# Patient Record
Sex: FEMALE | Race: ASIAN | NOT HISPANIC OR LATINO | Employment: FULL TIME | URBAN - METROPOLITAN AREA
[De-identification: names, ages, dates, MRNs, and addresses within clinical notes are randomized per-mention and may not be internally consistent; named-entity substitution may affect disease eponyms.]

---

## 2017-03-14 ENCOUNTER — AMBULATORY - HEALTHEAST (OUTPATIENT)
Dept: MULTI SPECIALTY CLINIC | Facility: CLINIC | Age: 27
End: 2017-03-14

## 2017-03-14 LAB — PAP SMEAR - HIM PATIENT REPORTED: NORMAL

## 2018-01-17 ENCOUNTER — RECORDS - HEALTHEAST (OUTPATIENT)
Dept: LAB | Facility: HOSPITAL | Age: 28
End: 2018-01-17

## 2018-01-18 LAB
HBV SURFACE AB SERPL IA-ACNC: NEGATIVE M[IU]/ML
RUBV IGG SERPL QL IA: NORMAL

## 2018-01-19 LAB
MEV IGG SER IA-ACNC: ABNORMAL
MUV IGG SER QL IA: NORMAL
QTF INTERPRETATION: NORMAL
QTF MITOGEN - NIL: >10 IU/ML
QTF NIL: 0.08 IU/ML
QTF RESULT: NEGATIVE
QTF TB ANTIGEN - NIL: -0.01 IU/ML
VZV IGG SER QL IA: NORMAL

## 2018-09-26 ENCOUNTER — OFFICE VISIT - HEALTHEAST (OUTPATIENT)
Dept: FAMILY MEDICINE | Facility: CLINIC | Age: 28
End: 2018-09-26

## 2018-09-26 ENCOUNTER — COMMUNICATION - HEALTHEAST (OUTPATIENT)
Dept: TELEHEALTH | Facility: CLINIC | Age: 28
End: 2018-09-26

## 2018-09-26 DIAGNOSIS — Z00.00 HEALTH CARE MAINTENANCE: ICD-10-CM

## 2018-09-26 DIAGNOSIS — Z01.84 IMMUNITY STATUS TESTING: ICD-10-CM

## 2018-09-26 DIAGNOSIS — F41.9 ANXIETY: ICD-10-CM

## 2018-09-26 LAB
ALBUMIN SERPL-MCNC: 3.9 G/DL (ref 3.5–5)
ALP SERPL-CCNC: 26 U/L (ref 45–120)
ALT SERPL W P-5'-P-CCNC: 12 U/L (ref 0–45)
ANION GAP SERPL CALCULATED.3IONS-SCNC: 9 MMOL/L (ref 5–18)
AST SERPL W P-5'-P-CCNC: 13 U/L (ref 0–40)
BILIRUB SERPL-MCNC: 0.4 MG/DL (ref 0–1)
BUN SERPL-MCNC: 11 MG/DL (ref 8–22)
CALCIUM SERPL-MCNC: 9.8 MG/DL (ref 8.5–10.5)
CHLORIDE BLD-SCNC: 106 MMOL/L (ref 98–107)
CHOLEST SERPL-MCNC: 222 MG/DL
CO2 SERPL-SCNC: 23 MMOL/L (ref 22–31)
CREAT SERPL-MCNC: 0.62 MG/DL (ref 0.6–1.1)
FASTING STATUS PATIENT QL REPORTED: YES
GFR SERPL CREATININE-BSD FRML MDRD: >60 ML/MIN/1.73M2
GLUCOSE BLD-MCNC: 79 MG/DL (ref 70–125)
HDLC SERPL-MCNC: 74 MG/DL
LDLC SERPL CALC-MCNC: 110 MG/DL
POTASSIUM BLD-SCNC: 4.7 MMOL/L (ref 3.5–5)
PROT SERPL-MCNC: 7.5 G/DL (ref 6–8)
SODIUM SERPL-SCNC: 138 MMOL/L (ref 136–145)
TRIGL SERPL-MCNC: 190 MG/DL

## 2018-09-26 ASSESSMENT — MIFFLIN-ST. JEOR: SCORE: 1364.99

## 2018-09-27 ENCOUNTER — COMMUNICATION - HEALTHEAST (OUTPATIENT)
Dept: FAMILY MEDICINE | Facility: CLINIC | Age: 28
End: 2018-09-27

## 2018-09-27 DIAGNOSIS — Z00.00 HEALTHCARE MAINTENANCE: ICD-10-CM

## 2018-09-27 LAB
HBV SURFACE AB SERPL IA-ACNC: NEGATIVE M[IU]/ML
RUBV IGG SERPL QL IA: POSITIVE

## 2018-09-28 LAB
GAMMA INTERFERON BACKGROUND BLD IA-ACNC: 0.07 IU/ML
M TB IFN-G BLD-IMP: NEGATIVE
MEV IGG SER IA-ACNC: POSITIVE
MITOGEN IGNF BCKGRD COR BLD-ACNC: -0.01 IU/ML
MITOGEN IGNF BCKGRD COR BLD-ACNC: -0.02 IU/ML
MUV IGG SER QL IA: POSITIVE
QTF INTERPRETATION: NORMAL
QTF MITOGEN - NIL: 2.14 IU/ML
VZV IGG SER QL IA: POSITIVE

## 2018-10-02 ENCOUNTER — AMBULATORY - HEALTHEAST (OUTPATIENT)
Dept: NURSING | Facility: CLINIC | Age: 28
End: 2018-10-02

## 2018-10-02 DIAGNOSIS — Z00.00 HEALTHCARE MAINTENANCE: ICD-10-CM

## 2018-11-07 ENCOUNTER — COMMUNICATION - HEALTHEAST (OUTPATIENT)
Dept: FAMILY MEDICINE | Facility: CLINIC | Age: 28
End: 2018-11-07

## 2018-11-08 ENCOUNTER — AMBULATORY - HEALTHEAST (OUTPATIENT)
Dept: NURSING | Facility: CLINIC | Age: 28
End: 2018-11-08

## 2018-12-12 ENCOUNTER — OFFICE VISIT - HEALTHEAST (OUTPATIENT)
Dept: FAMILY MEDICINE | Facility: CLINIC | Age: 28
End: 2018-12-12

## 2018-12-12 ENCOUNTER — COMMUNICATION - HEALTHEAST (OUTPATIENT)
Dept: TELEHEALTH | Facility: CLINIC | Age: 28
End: 2018-12-12

## 2018-12-12 DIAGNOSIS — R10.13 ABDOMINAL PAIN, EPIGASTRIC: ICD-10-CM

## 2018-12-12 LAB
ALBUMIN SERPL-MCNC: 3.5 G/DL (ref 3.5–5)
ALP SERPL-CCNC: 29 U/L (ref 45–120)
ALT SERPL W P-5'-P-CCNC: 35 U/L (ref 0–45)
AMYLASE SERPL-CCNC: 47 U/L (ref 5–120)
AST SERPL W P-5'-P-CCNC: 28 U/L (ref 0–40)
BILIRUB DIRECT SERPL-MCNC: <0.1 MG/DL
BILIRUB SERPL-MCNC: 0.2 MG/DL (ref 0–1)
ERYTHROCYTE [DISTWIDTH] IN BLOOD BY AUTOMATED COUNT: 10.7 % (ref 11–14.5)
HCT VFR BLD AUTO: 35.9 % (ref 35–47)
HGB BLD-MCNC: 12.3 G/DL (ref 12–16)
LIPASE SERPL-CCNC: 57 U/L (ref 0–52)
MCH RBC QN AUTO: 29.6 PG (ref 27–34)
MCHC RBC AUTO-ENTMCNC: 34.2 G/DL (ref 32–36)
MCV RBC AUTO: 86 FL (ref 80–100)
PLATELET # BLD AUTO: 293 THOU/UL (ref 140–440)
PMV BLD AUTO: 7.3 FL (ref 7–10)
PROT SERPL-MCNC: 6.6 G/DL (ref 6–8)
RBC # BLD AUTO: 4.15 MILL/UL (ref 3.8–5.4)
WBC: 3.6 THOU/UL (ref 4–11)

## 2018-12-12 ASSESSMENT — MIFFLIN-ST. JEOR: SCORE: 1381.77

## 2019-10-31 ENCOUNTER — OFFICE VISIT (OUTPATIENT)
Dept: URGENT CARE | Facility: URGENT CARE | Age: 29
End: 2019-10-31
Payer: COMMERCIAL

## 2019-10-31 VITALS
RESPIRATION RATE: 16 BRPM | OXYGEN SATURATION: 98 % | SYSTOLIC BLOOD PRESSURE: 133 MMHG | TEMPERATURE: 98.5 F | HEART RATE: 69 BPM | WEIGHT: 152.2 LBS | DIASTOLIC BLOOD PRESSURE: 78 MMHG

## 2019-10-31 DIAGNOSIS — R07.0 THROAT PAIN: ICD-10-CM

## 2019-10-31 DIAGNOSIS — J06.9 VIRAL UPPER RESPIRATORY TRACT INFECTION WITH COUGH: Primary | ICD-10-CM

## 2019-10-31 LAB
DEPRECATED S PYO AG THROAT QL EIA: NORMAL
SPECIMEN SOURCE: NORMAL

## 2019-10-31 PROCEDURE — 99203 OFFICE O/P NEW LOW 30 MIN: CPT | Performed by: PHYSICIAN ASSISTANT

## 2019-10-31 PROCEDURE — 87081 CULTURE SCREEN ONLY: CPT | Performed by: PHYSICIAN ASSISTANT

## 2019-10-31 PROCEDURE — 87880 STREP A ASSAY W/OPTIC: CPT | Performed by: PHYSICIAN ASSISTANT

## 2019-10-31 RX ORDER — NORGESTIMATE AND ETHINYL ESTRADIOL 7DAYSX3 28
KIT ORAL
COMMUNITY
Start: 2019-10-01

## 2019-10-31 ASSESSMENT — ENCOUNTER SYMPTOMS
MYALGIAS: 0
BRUISES/BLEEDS EASILY: 0
NAUSEA: 0
PALPITATIONS: 0
SHORTNESS OF BREATH: 0
NECK STIFFNESS: 0
WEAKNESS: 0
HEADACHES: 0
RHINORRHEA: 0
HEMATOLOGIC/LYMPHATIC NEGATIVE: 1
ENDOCRINE NEGATIVE: 1
CHILLS: 0
EYES NEGATIVE: 1
NECK PAIN: 0
ALLERGIC/IMMUNOLOGIC NEGATIVE: 1
JOINT SWELLING: 0
DIZZINESS: 0
SORE THROAT: 1
LIGHT-HEADEDNESS: 0
CARDIOVASCULAR NEGATIVE: 1
ARTHRALGIAS: 0
COUGH: 1
BACK PAIN: 0
MUSCULOSKELETAL NEGATIVE: 1
FEVER: 0
DIARRHEA: 0
WOUND: 0
VOMITING: 0

## 2019-10-31 NOTE — LETTER
November 1, 2019      Estuardo Chavarria  [address] [address]      Dear ,    The results of your recent lab tests were within normal limits- negative throat culture. Enclosed is a copy of these results.  If you have any further questions or problems, please contact our office at [phone].    Sincerely,        Jerzy Kenny PA-C    Resulted Orders   Strep, Rapid Screen   Result Value Ref Range    Specimen Description Throat     Rapid Strep A Screen       NEGATIVE: No Group A streptococcal antigen detected by immunoassay, await culture report.   Beta strep group A culture   Result Value Ref Range    Specimen Description Throat     Culture Micro No beta hemolytic Streptococcus Group A isolated

## 2019-10-31 NOTE — PATIENT INSTRUCTIONS
Patient Education     Viral Upper Respiratory Illness (Adult)    You have a viral upper respiratory illness (URI), which is another term for the common cold. This illness is contagious during the first few days. It is spread through the air by coughing and sneezing. It may also be spread by direct contact (touching the sick person and then touching your own eyes, nose, or mouth). Frequent handwashing will decrease risk of spread. Most viral illnesses go away within 7 to 10 days with rest and simple home remedies. Sometimes the illness may last for several weeks. Antibiotics will not kill a virus, and they are generally not prescribed for this condition.  Home care    If symptoms are severe, rest at home for the first 2 to 3 days. When you resume activity, don't let yourself get too tired.    Don't smoke. If you need help stopping, talk with your healthcare provider.    Avoid being exposed to cigarette smoke (yours or others ).    You may use acetaminophen or ibuprofen to control pain and fever, unless another medicine was prescribed. If you have chronic liver or kidney disease, have ever had a stomach ulcer or gastrointestinal bleeding, or are taking blood-thinning medicines, talk with your healthcare provider before using these medicines. Aspirin should never be given to anyone under 18 years of age who is ill with a viral infection or fever. It may cause severe liver or brain damage.    Your appetite may be poor, so a light diet is fine. Stay well hydrated by drinking 6 to 8 glasses of fluids per day (water, soft drinks, juices, tea, or soup). Extra fluids will help loosen secretions in the nose and lungs.    Over-the-counter cold medicines will not shorten the length of time you re sick, but they may be helpful for the following symptoms: cough, sore throat, and nasal and sinus congestion. If you take prescription medicines, ask your healthcare provider or pharmacist which over-the-counter medicines are safe to  use. (Note: Don't use decongestants if you have high blood pressure.)  Follow-up care  Follow up with your healthcare provider, or as advised.  When to seek medical advice  Call your healthcare provider right away if any of these occur:    Cough with lots of colored sputum (mucus)    Severe headache; face, neck, or ear pain    Difficulty swallowing due to throat pain    Fever of 100.4 F (38 C) or higher, or as directed by your healthcare provider  Call 911  Call 911 if any of these occur:    Chest pain, shortness of breath, wheezing, or difficulty breathing    Coughing up blood    Very severe pain with swallowing, especially if it goes along with a muffled voice   Date Last Reviewed: 6/1/2018 2000-2018 [employer]. [address] [place] [address], [place] [address]. All rights reserved. This information is not intended as a substitute for professional medical care. Always follow your healthcare professional's instructions.           Patient Education     Self-Care for Sore Throats    Sore throats happen for many reasons, such as colds, allergies, and infections caused by viruses or bacteria. In any case, your throat becomes red and sore. Your goal for self-care is to reduce your discomfort while giving your throat a chance to heal.  Moisten and soothe your throat  Tips include the following:    Try a sip of water first thing after waking up.    Keep your throat moist by drinking 6 or more glasses of clear liquids every day.    Run a cool-air humidifier in your room overnight.    Avoid cigarette smoke.     Suck on throat lozenges, cough drops, hard candy, ice chips, or frozen fruit-juice bars. Use the sugar-free versions if your diet or medical condition requires them.  Gargle to ease irritation  Gargling every hour or 2 can ease irritation. Try gargling with 1 of these solutions:    1/4 teaspoon of salt in 1/2 cup of warm water    An over-the-counter anesthetic gargle  Use medicine for more  relief  Over-the-counter medicine can reduce sore throat symptoms. Ask your pharmacist if you have questions about which medicine to use:    Ease pain with anesthetic sprays. Aspirin or an aspirin substitute also helps. Remember, never give aspirin to anyone 18 or younger, or if you are already taking blood thinners.     For sore throats caused by allergies, try antihistamines to block the allergic reaction.    Remember: unless a sore throat is caused by a bacterial infection, antibiotics won t help you.  Prevent future sore throats  Prevention tips include the following:    Stop smoking or reduce contact with secondhand smoke. Smoke irritates the tender throat lining.    Limit contact with pets and with allergy-causing substances, such as pollen and mold.    When you re around someone with a sore throat or cold, wash your hands often to keep viruses or bacteria from spreading.    Don t strain your vocal cords.  Call your healthcare provider  Contact your healthcare provider if you have:    A temperature over 101 F (38.3 C)    White spots on the throat    Great difficulty swallowing    Trouble breathing    A skin rash    Recent exposure to someone else with strep bacteria    Severe hoarseness and swollen glands in the neck or jaw   Date Last Reviewed: 8/1/2016 2000-2018 [employer]. [address] [place] [address], [place] [address]. All rights reserved. This information is not intended as a substitute for professional medical care. Always follow your healthcare professional's instructions.

## 2019-10-31 NOTE — PROGRESS NOTES
Chief Complaint:     Chief Complaint   Patient presents with     Throat Problem     Sharp pains in the throat, throat feels tight and swollen. Started Sunday and has been getting worse every day.        HPI: Estuardo Chavarria is an 29 year old female who presents with chest congestion, cough nonproductive, occasional, nasal congestion and sore throat. Symptoms began 1  days ago and has unchanged.  There is no shortness of breath, wheezing and chest pain.      Recent travel?  no.    Patient is new to [place].    ROS:     Review of Systems   Constitutional: Negative for chills and fever.   HENT: Positive for congestion and sore throat. Negative for ear pain and rhinorrhea.    Eyes: Negative.    Respiratory: Positive for cough. Negative for shortness of breath.    Cardiovascular: Negative.  Negative for chest pain and palpitations.   Gastrointestinal: Negative for diarrhea, nausea and vomiting.   Endocrine: Negative.    Genitourinary: Negative.    Musculoskeletal: Negative.  Negative for arthralgias, back pain, joint swelling, myalgias, neck pain and neck stiffness.   Skin: Negative.  Negative for rash and wound.   Allergic/Immunologic: Negative.  Negative for immunocompromised state.   Neurological: Negative for dizziness, weakness, light-headedness and headaches.   Hematological: Negative.  Does not bruise/bleed easily.     No pertinent family or medical Hx at this time.  Patient has never smoked.  No pertinent surgical Hx at this time.     Respiratory History  occasional episodes of bronchitis       Family History   History reviewed. No pertinent family history.     Problem history  There is no problem list on file for this patient.       Allergies  No Known Allergies     Social History  Social History     Socioeconomic History     Marital status: Single     Spouse name: Not on file     Number of children: Not on file     Years of education: Not on file     Highest education level: Not on file   Occupational History      Not on file   Social Needs     Financial resource strain: Not on file     Food insecurity:     Worry: Not on file     Inability: Not on file     Transportation needs:     Medical: Not on file     Non-medical: Not on file   Tobacco Use     Smoking status: Never Smoker     Smokeless tobacco: Never Used   Substance and Sexual Activity     Alcohol use: Not on file     Drug use: Not on file     Sexual activity: Not on file   Lifestyle     Physical activity:     Days per week: Not on file     Minutes per session: Not on file     Stress: Not on file   Relationships     Social connections:     Talks on phone: Not on file     Gets together: Not on file     Attends Gnosticist service: Not on file     Active member of club or organization: Not on file     Attends meetings of clubs or organizations: Not on file     Relationship status: Not on file     Intimate partner violence:     Fear of current or ex partner: Not on file     Emotionally abused: Not on file     Physically abused: Not on file     Forced sexual activity: Not on file   Other Topics Concern     Not on file   Social History Narrative     Not on file        Current Meds    Current Outpatient Medications:      TRI-VYLIBRA 0.18/0.215/0.25 MG-35 MCG tablet, , Disp: , Rfl:         OBJECTIVE     Vital signs reviewed by Jerzy Kenny PA-C  /78   Pulse 69   Temp 98.5  F (36.9  C) (Oral)   Resp 16   Wt 69 kg (152 lb 3.2 oz)   SpO2 98%      Physical Exam  Vitals signs and nursing note reviewed.   Constitutional:       General: She is not in acute distress.     Appearance: She is well-developed. She is not ill-appearing, toxic-appearing or diaphoretic.   HENT:      Head: Normocephalic and atraumatic.      Right Ear: Hearing, tympanic membrane, ear canal and external ear normal. Tympanic membrane is not perforated, erythematous, retracted or bulging.      Left Ear: Hearing, tympanic membrane, ear canal and external ear normal. Tympanic membrane is not perforated,  erythematous, retracted or bulging.      Nose: Mucosal edema and congestion present. No rhinorrhea.      Right Sinus: No maxillary sinus tenderness or frontal sinus tenderness.      Left Sinus: No maxillary sinus tenderness or frontal sinus tenderness.      Mouth/Throat:      Pharynx: Posterior oropharyngeal erythema present. No pharyngeal swelling, oropharyngeal exudate or uvula swelling.      Tonsils: No tonsillar exudate or tonsillar abscesses. Swellin+ on the right. 2+ on the left.   Eyes:      General:         Right eye: No discharge.         Left eye: No discharge.      Pupils: Pupils are equal, round, and reactive to light.   Neck:      Musculoskeletal: Normal range of motion and neck supple.   Cardiovascular:      Rate and Rhythm: Normal rate and regular rhythm.      Heart sounds: Normal heart sounds. No murmur. No friction rub. No gallop.    Pulmonary:      Effort: Pulmonary effort is normal. No respiratory distress.      Breath sounds: Normal breath sounds. No decreased breath sounds, wheezing, rhonchi or rales.   Chest:      Chest wall: No tenderness.   Abdominal:      General: Bowel sounds are normal. There is no distension.      Palpations: Abdomen is soft. There is no mass.      Tenderness: There is no tenderness. There is no guarding.   Lymphadenopathy:      Head:      Right side of head: Submandibular and tonsillar adenopathy present. No submental, preauricular or posterior auricular adenopathy.      Left side of head: Submandibular and tonsillar adenopathy present. No submental, preauricular or posterior auricular adenopathy.      Cervical: No cervical adenopathy.   Skin:     General: Skin is warm and dry.   Neurological:      Mental Status: She is alert and oriented to person, place, and time.      Cranial Nerves: No cranial nerve deficit.      Deep Tendon Reflexes: Reflexes are normal and symmetric.   Psychiatric:         Behavior: Behavior normal. Behavior is cooperative.         Thought  Content: Thought content normal.         Judgement: Judgment normal.           Labs:     Results for orders placed or performed in visit on 10/31/19   Strep, Rapid Screen   Result Value Ref Range    Specimen Description Throat     Rapid Strep A Screen       NEGATIVE: No Group A streptococcal antigen detected by immunoassay, await culture report.       Medical Decision Making:    Differential Diagnosis:  URI Adult/Peds:  Bronchitis-viral, Influenza, Pneumonia, Strep pharyngitis, Tonsilitis, Viral pharyngitis, Viral syndrome and Viral upper respiratory illness        ASSESSMENT    1. Viral upper respiratory tract infection with cough    2. Throat pain        PLAN    Patient presents with 1 day(s) chest congestion, cough nonproductive, occasional, nasal congestion and sore throat.    Patient is in no acute distress.    Temp is 98.5 in clinic today, lung sounds were clear, and O2 sats at 98% on RA.  Imaging to rule out pneumonia is not indicated at this time.  RST was negative.  We will call with culture results only if positive.  Rest, Push fluids, vaporizer, elevation of head of bed.  Ibuprofen and or Tylenol for any fever or body aches.  Over the counter cough suppressant- PRN- as discussed.   If symptoms worsen, recheck immediately otherwise follow up with your PCP in 1 week if symptoms are not improving.  Worrisome symptoms discussed with instructions to go to the ED.  Patient verbalized understanding and agreed with this plan.         Jerzy Kenny PA-C  10/31/2019, 11:22 AM

## 2019-11-01 LAB
BACTERIA SPEC CULT: NORMAL
SPECIMEN SOURCE: NORMAL

## 2019-12-10 ENCOUNTER — OFFICE VISIT - HEALTHEAST (OUTPATIENT)
Dept: FAMILY MEDICINE | Facility: CLINIC | Age: 29
End: 2019-12-10

## 2019-12-10 DIAGNOSIS — Z30.011 ENCOUNTER FOR INITIAL PRESCRIPTION OF CONTRACEPTIVE PILLS: ICD-10-CM

## 2019-12-10 DIAGNOSIS — Z00.00 ROUTINE HEALTH MAINTENANCE: ICD-10-CM

## 2019-12-10 DIAGNOSIS — E78.2 MIXED HYPERLIPIDEMIA: ICD-10-CM

## 2019-12-10 DIAGNOSIS — Z11.4 SCREENING FOR HIV (HUMAN IMMUNODEFICIENCY VIRUS): ICD-10-CM

## 2019-12-10 DIAGNOSIS — D72.819 LEUKOPENIA, UNSPECIFIED TYPE: ICD-10-CM

## 2019-12-10 DIAGNOSIS — G44.209 TENSION HEADACHE: ICD-10-CM

## 2019-12-10 DIAGNOSIS — R74.8 ELEVATED LIPASE: ICD-10-CM

## 2019-12-10 LAB
ERYTHROCYTE [DISTWIDTH] IN BLOOD BY AUTOMATED COUNT: 11.4 % (ref 11–14.5)
HCG UR QL: NEGATIVE
HCT VFR BLD AUTO: 38.7 % (ref 35–47)
HGB BLD-MCNC: 13.2 G/DL (ref 12–16)
MCH RBC QN AUTO: 29.8 PG (ref 27–34)
MCHC RBC AUTO-ENTMCNC: 34 G/DL (ref 32–36)
MCV RBC AUTO: 88 FL (ref 80–100)
PLATELET # BLD AUTO: 308 THOU/UL (ref 140–440)
PMV BLD AUTO: 7.4 FL (ref 7–10)
RBC # BLD AUTO: 4.42 MILL/UL (ref 3.8–5.4)
WBC: 5.1 THOU/UL (ref 4–11)

## 2019-12-10 ASSESSMENT — MIFFLIN-ST. JEOR: SCORE: 1365.56

## 2019-12-11 LAB
ALBUMIN SERPL-MCNC: 3.9 G/DL (ref 3.5–5)
ALP SERPL-CCNC: 39 U/L (ref 45–120)
ALT SERPL W P-5'-P-CCNC: 13 U/L (ref 0–45)
ANION GAP SERPL CALCULATED.3IONS-SCNC: 8 MMOL/L (ref 5–18)
AST SERPL W P-5'-P-CCNC: 12 U/L (ref 0–40)
BILIRUB SERPL-MCNC: 0.4 MG/DL (ref 0–1)
BUN SERPL-MCNC: 10 MG/DL (ref 8–22)
CALCIUM SERPL-MCNC: 9.1 MG/DL (ref 8.5–10.5)
CHLORIDE BLD-SCNC: 103 MMOL/L (ref 98–107)
CHOLEST SERPL-MCNC: 239 MG/DL
CO2 SERPL-SCNC: 26 MMOL/L (ref 22–31)
CREAT SERPL-MCNC: 0.66 MG/DL (ref 0.6–1.1)
FASTING STATUS PATIENT QL REPORTED: NO
GFR SERPL CREATININE-BSD FRML MDRD: >60 ML/MIN/1.73M2
GLUCOSE BLD-MCNC: 84 MG/DL (ref 70–125)
HDLC SERPL-MCNC: 70 MG/DL
HIV 1+2 AB+HIV1 P24 AG SERPL QL IA: NEGATIVE
LDLC SERPL CALC-MCNC: 140 MG/DL
LIPASE SERPL-CCNC: 32 U/L (ref 0–52)
POTASSIUM BLD-SCNC: 4.2 MMOL/L (ref 3.5–5)
PROT SERPL-MCNC: 7.4 G/DL (ref 6–8)
SODIUM SERPL-SCNC: 137 MMOL/L (ref 136–145)
TRIGL SERPL-MCNC: 143 MG/DL

## 2019-12-23 ENCOUNTER — COMMUNICATION - HEALTHEAST (OUTPATIENT)
Dept: FAMILY MEDICINE | Facility: CLINIC | Age: 29
End: 2019-12-23

## 2019-12-23 DIAGNOSIS — Z11.1 TUBERCULOSIS SCREENING: ICD-10-CM

## 2019-12-26 ENCOUNTER — RECORDS - HEALTHEAST (OUTPATIENT)
Dept: LAB | Facility: CLINIC | Age: 29
End: 2019-12-26

## 2019-12-27 LAB
GAMMA INTERFERON BACKGROUND BLD IA-ACNC: 0.03 IU/ML
M TB IFN-G BLD-IMP: NEGATIVE
MITOGEN IGNF BCKGRD COR BLD-ACNC: -0.01 IU/ML
MITOGEN IGNF BCKGRD COR BLD-ACNC: 0 IU/ML
QTF INTERPRETATION: NORMAL
QTF MITOGEN - NIL: 8.13 IU/ML

## 2020-09-08 ENCOUNTER — COMMUNICATION - HEALTHEAST (OUTPATIENT)
Dept: FAMILY MEDICINE | Facility: CLINIC | Age: 30
End: 2020-09-08

## 2020-09-08 DIAGNOSIS — Z30.011 ENCOUNTER FOR INITIAL PRESCRIPTION OF CONTRACEPTIVE PILLS: ICD-10-CM

## 2020-10-09 ENCOUNTER — COMMUNICATION - HEALTHEAST (OUTPATIENT)
Dept: FAMILY MEDICINE | Facility: CLINIC | Age: 30
End: 2020-10-09

## 2020-10-09 DIAGNOSIS — Z30.011 ENCOUNTER FOR INITIAL PRESCRIPTION OF CONTRACEPTIVE PILLS: ICD-10-CM

## 2020-12-22 ENCOUNTER — COMMUNICATION - HEALTHEAST (OUTPATIENT)
Dept: FAMILY MEDICINE | Facility: CLINIC | Age: 30
End: 2020-12-22

## 2020-12-22 DIAGNOSIS — Z30.011 ENCOUNTER FOR INITIAL PRESCRIPTION OF CONTRACEPTIVE PILLS: ICD-10-CM

## 2021-01-11 ENCOUNTER — OFFICE VISIT - HEALTHEAST (OUTPATIENT)
Dept: FAMILY MEDICINE | Facility: CLINIC | Age: 31
End: 2021-01-11

## 2021-01-11 DIAGNOSIS — N76.0 BV (BACTERIAL VAGINOSIS): ICD-10-CM

## 2021-01-11 DIAGNOSIS — B96.89 BV (BACTERIAL VAGINOSIS): ICD-10-CM

## 2021-01-11 DIAGNOSIS — N88.8 FRIABLE CERVIX: ICD-10-CM

## 2021-01-11 DIAGNOSIS — B02.9 HERPES ZOSTER WITHOUT COMPLICATION: ICD-10-CM

## 2021-01-11 DIAGNOSIS — Z30.41 ENCOUNTER FOR SURVEILLANCE OF CONTRACEPTIVE PILLS: ICD-10-CM

## 2021-01-11 DIAGNOSIS — Z12.4 CERVICAL CANCER SCREENING: ICD-10-CM

## 2021-01-11 LAB
CLUE CELLS: ABNORMAL
TRICHOMONAS, WET PREP: ABNORMAL
YEAST, WET PREP: ABNORMAL

## 2021-01-11 ASSESSMENT — MIFFLIN-ST. JEOR: SCORE: 1360.45

## 2021-01-13 LAB
BKR LAB AP ABNORMAL BLEEDING: NO
BKR LAB AP BIRTH CONTROL/HORMONES: NORMAL
BKR LAB AP CERVICAL APPEARANCE: NORMAL
BKR LAB AP GYN ADEQUACY: NORMAL
BKR LAB AP GYN INTERPRETATION: NORMAL
BKR LAB AP HPV REFLEX: NORMAL
BKR LAB AP LMP: NORMAL
BKR LAB AP PATIENT STATUS: NORMAL
BKR LAB AP PREVIOUS ABNORMAL: NO
BKR LAB AP PREVIOUS NORMAL: 2017
HIGH RISK?: NO
PATH REPORT.COMMENTS IMP SPEC: NORMAL
RESULT FLAG (HE HISTORICAL CONVERSION): NORMAL

## 2021-01-25 ENCOUNTER — COMMUNICATION - HEALTHEAST (OUTPATIENT)
Dept: FAMILY MEDICINE | Facility: CLINIC | Age: 31
End: 2021-01-25

## 2021-01-25 DIAGNOSIS — Z00.00 HEALTHCARE MAINTENANCE: ICD-10-CM

## 2021-01-28 ENCOUNTER — AMBULATORY - HEALTHEAST (OUTPATIENT)
Dept: NURSING | Facility: CLINIC | Age: 31
End: 2021-01-28

## 2021-01-28 ENCOUNTER — AMBULATORY - HEALTHEAST (OUTPATIENT)
Dept: LAB | Facility: CLINIC | Age: 31
End: 2021-01-28

## 2021-01-28 DIAGNOSIS — Z00.00 HEALTHCARE MAINTENANCE: ICD-10-CM

## 2021-01-29 LAB — MEV IGG SER IA-ACNC: POSITIVE

## 2021-06-02 VITALS — WEIGHT: 149 LBS | HEIGHT: 63 IN | BODY MASS INDEX: 26.4 KG/M2

## 2021-06-02 VITALS — WEIGHT: 149.2 LBS | BODY MASS INDEX: 25.47 KG/M2 | HEIGHT: 64 IN

## 2021-06-04 VITALS
OXYGEN SATURATION: 100 % | HEART RATE: 85 BPM | SYSTOLIC BLOOD PRESSURE: 106 MMHG | DIASTOLIC BLOOD PRESSURE: 80 MMHG | BODY MASS INDEX: 26.58 KG/M2 | HEIGHT: 63 IN | WEIGHT: 150 LBS

## 2021-06-04 NOTE — PROGRESS NOTES
Assessment/Plan:     Patient presents today for routine physical examination.    Healthcare Maintenance: USPSTF recommendations for age 28;  Patient has been counseled on/screened for:  - the benefits of supplemental folic acid   - intimate partner violence and there are no concerns at this time  - a healthful diet and physical activity for CVD disease prevention  - Diabetes and hyperlipidemia: screening was performed.   Sexually transmitted infections: Patient would not like to be screened for chlamydia, gonorrhea, syphilis, HIV, and hepatitis  Immunizations: up to date  Cervical Cancer Screening: patient has been screened for cervical cancer per protocol in 2017, results were negative, due in 2020      Additional concerns are as detailed below:    1. Leukopenia, unspecified type  - HM2(CBC w/o Differential)    2. Elevated lipase  - Lipase    3. Mixed hyperlipidemia  - Lipid Cascade RANDOM    4. Routine health maintenance  - Comprehensive Metabolic Panel    5. Tension headache  Recommended heat and gentle stretching    6. Encounter for initial prescription of contraceptive pills  After extensive discussion and shared decision making, we chose to change the dose the patient's oral contraceptive for a trial on medication which would not impact patient's libido, would improve acne, and would not affect mood.  If patient does not like this medication, I would have a low threshold to implant a Nexplanon.  - desogestrel-ethinyl estradiol (APRI) 0.15-0.03 mg per tablet; Take 1 tablet by mouth daily.  Dispense: 3 Package; Refill: 3  - Pregnancy, Urine      AVS printed and given to patient.  Return to clinic in 12 weeks.    I have had an Advance Directives discussion with the patient.    This note has been dictated using voice recognition software. Any grammatical or context distortions are unintentional and inherent to the the software.     Ema Quinones MD  Family Medicine [facility]    Subjective:     Estuardo WAHL  "Deon is a 29 y.o. female who presents to clinic for routine physical.    Additional concerns include:    1.  Patient was recently on oral contraception.  She stopped it last month.  She is using condoms.  She does not desire to become pregnant.  She is interested in understanding her options for contraception.  Patient does not like the idea of hormones in her body and wonders what can be done to improve libido and also skin quality.    Diet: well balanced diet  Physical Activity: The patient engages in little, if any physical activity.    PHQ-2 Score:  0    Health Care Directive: not on file but handout provided    Patient Care Team:   PCP: Ema Quinones     Past Medical History, Family History, and Social History reviewed.     Review of systems is as stated in HPI.  Patient endorses: back pain  The remainder of the 10 system review is otherwise negative.    Objective:     /80   Pulse 85   Ht 5' 2.75\" (1.594 m)   Wt 150 lb (68 kg)   LMP 11/28/2019   SpO2 100%   BMI 26.78 kg/m    Gen: Alert, NAD, appears stated age, normal hygiene   Eyes: conjunctivae without injection, sclera clear, EOMI  ENT/mouth: nares clear, septum midline, absent rhinorrhea,absent pharyngeal injection, neck is supple, no thyroid enlargement  CV: RRR, no murmur appreciated, pedal edema absent bilaterally  Resp: CTAB, no wheezes, rales or ronchi  ABD: normoactive, non-tender to palpation, nondistended  MSK: grossly full range of motion in all joints, no obvious deformity  Neuro: CN II-XII grossly intact, no deficits in coordination  Psych: no apparent hallucinations or delusions, no pressured speech; alert, oriented x3  SKIN: dry and without lesions  Heme/lymph: no pallor, no active bleeding/bruising, no adenopathy appreciated    Medications:  Current Outpatient Medications   Medication Sig     desogestrel-ethinyl estradiol (APRI) 0.15-0.03 mg per tablet Take 1 tablet by mouth daily.       Allergies:  No Known " Allergies    PMH:  History reviewed. No pertinent past medical history.    PSH:  History reviewed. No pertinent surgical history.    Family Hx:  Family History   Problem Relation Age of Onset     Diabetes Paternal Grandfather        Social History:  Social History     Socioeconomic History     Marital status: Single     Spouse name: Not on file     Number of children: Not on file     Years of education: Not on file     Highest education level: Not on file   Occupational History     Not on file   Social Needs     Financial resource strain: Not on file     Food insecurity:     Worry: Not on file     Inability: Not on file     Transportation needs:     Medical: Not on file     Non-medical: Not on file   Tobacco Use     Smoking status: Never Smoker     Smokeless tobacco: Never Used   Substance and Sexual Activity     Alcohol use: Yes     Alcohol/week: 1.0 standard drinks     Types: 1 Glasses of wine per week     Drug use: No     Sexual activity: Yes     Partners: Male   Lifestyle     Physical activity:     Days per week: Not on file     Minutes per session: Not on file     Stress: Not on file   Relationships     Social connections:     Talks on phone: Not on file     Gets together: Not on file     Attends Voodoo service: Not on file     Active member of club or organization: Not on file     Attends meetings of clubs or organizations: Not on file     Relationship status: Not on file     Intimate partner violence:     Fear of current or ex partner: Not on file     Emotionally abused: Not on file     Physically abused: Not on file     Forced sexual activity: Not on file   Other Topics Concern     Not on file   Social History Narrative     Not on file       LDL Calculated (mg/dL)   Date Value   09/26/2018 110        Immunization History   Administered Date(s) Administered     DTP 1991, 1991, 1991, 09/02/1992, 01/02/1996     HIB (HBOC) 1991, 1991, 1991, 09/02/1992     HPV Quadrivalent  09/04/2012, 11/05/2012, 03/11/2013     Hep B, Adult 10/02/2018, 11/08/2018     Hep B, Peds or Adolescent 1991, 1991, 08/12/1992     Influenza, inj, historic,unspecified 09/19/2018, 11/01/2019     Influenza,seasonal quad, PF, =/> 6months 11/15/2016     MMR 08/12/1992, 06/13/2003     OPV, Unspecified 1991, 1991, 1991, 08/12/1992     OPV,Trivalent,Historic([id number] only) 1991, 1991, 1991, 08/12/1992     Td, Adult, Absorbed 06/13/2003     Tdap 09/04/2012     There are no preventive care reminders to display for this patient.

## 2021-06-04 NOTE — TELEPHONE ENCOUNTER
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

## 2021-06-05 VITALS
SYSTOLIC BLOOD PRESSURE: 108 MMHG | HEIGHT: 63 IN | WEIGHT: 148 LBS | OXYGEN SATURATION: 98 % | DIASTOLIC BLOOD PRESSURE: 78 MMHG | TEMPERATURE: 98 F | BODY MASS INDEX: 26.22 KG/M2

## 2021-06-11 NOTE — TELEPHONE ENCOUNTER
Refill Approved    Rx renewed per Medication Renewal Policy. Medication was last renewed on 12/10/19.    Jojo Gomez, [facility] Connection Triage/Med Refill 9/9/2020     Requested Prescriptions   Pending Prescriptions Disp Refills     APRI 0.15-0.03 mg per tablet [Pharmacy Med Name: APRI 28 DAY TABLET] 84 tablet 2     Sig: TAKE 1 TABLET BY MOUTH EVERY DAY       Oral Contraceptives Protocol Passed - 9/8/2020  8:20 AM        Passed - Visit with PCP or prescribing provider visit in last 12 months      Last office visit with prescriber/PCP: Visit date not found OR same dept: Visit date not found OR same specialty: 12/12/2018 Claude Sheffield MD  Last physical: 12/10/2019 Last MTM visit: Visit date not found   Next visit within 3 mo: Visit date not found  Next physical within 3 mo: Visit date not found  Prescriber OR PCP: Ema Quinones MD  Last diagnosis associated with med order: 1. Encounter for initial prescription of contraceptive pills  - APRI 0.15-0.03 mg per tablet [Pharmacy Med Name: APRI 28 DAY TABLET]; TAKE 1 TABLET BY MOUTH EVERY DAY  Dispense: 84 tablet; Refill: 2    If protocol passes may refill for 12 months if within 3 months of last provider visit (or a total of 15 months).

## 2021-06-12 NOTE — TELEPHONE ENCOUNTER
Refill Approved    Rx renewed per Medication Renewal Policy. Medication was last renewed on 9/9/20.    Ivette Vallejo, [facility] Connection Triage/Med Refill 10/12/2020     Requested Prescriptions   Pending Prescriptions Disp Refills     desogestreL-ethinyl estradioL (APRI) 0.15-0.03 mg per tablet 84 tablet 0     Sig: Take 1 tablet by mouth daily.       Oral Contraceptives Protocol Passed - 10/9/2020  2:19 PM        Passed - Visit with PCP or prescribing provider visit in last 12 months      Last office visit with prescriber/PCP: Visit date not found OR same dept: Visit date not found OR same specialty: 12/12/2018 Claude Sheffield MD  Last physical: 12/10/2019 Last MTM visit: Visit date not found   Next visit within 3 mo: Visit date not found  Next physical within 3 mo: Visit date not found  Prescriber OR PCP: Ema Quinones MD  Last diagnosis associated with med order: 1. Encounter for initial prescription of contraceptive pills  - desogestreL-ethinyl estradioL (APRI) 0.15-0.03 mg per tablet; Take 1 tablet by mouth daily.  Dispense: 84 tablet; Refill: 0    If protocol passes may refill for 12 months if within 3 months of last provider visit (or a total of 15 months).

## 2021-06-14 NOTE — PROGRESS NOTES
Assessment/Plan:     Patient presents today for routine physical examination.    Healthcare Maintenance: USPSTF recommendations for age 30;  Patient has been counseled on/screened for:  - the benefits of supplemental folic acid   - intimate partner violence and there are no concerns at this time  - a healthful diet and physical activity for CVD prevention  - Diabetes and hyperlipidemia: screening was not performed.   Sexually transmitted infections: Patient would not like to be screened for chlamydia, gonorrhea, syphilis, HIV, and hepatitis  Immunizations: up to date  Cervical Cancer Screening: due today      Additional concerns are as detailed below:    1. Herpes zoster without complication  Patient has a history of recurrent lesions occurring in a dermatomal pattern clustering in the area of the left hip consistent with shingles.  Given patient's young age, and the frequency of these outburst, I feel strongly that patient does need the zoster vaccination.  Patient will contact her insurance to see if this is possible.  Regardless, valacyclovir prescribed as an.  Treatment the  Patient notices these lesions.  - valACYclovir (VALTREX) 1000 MG tablet; Take 1 tablet (1,000 mg total) by mouth 3 (three) times a day for 7 days.  Dispense: 21 tablet; Refill: 3    2. Encounter for surveillance of contraceptive pills  Refilled:  - desogestreL-ethinyl estradioL (APRI) 0.15-0.03 mg per tablet; Take 1 tablet by mouth daily.  Dispense: 28 tablet; Refill: 12    3. Friable cervix  4. BV appreciated on wet prep  - clinda x 7 days    AVS printed and given to patient.  Return to clinic in 1 year.    I have had an Advance Directives discussion with the patient.    This note has been dictated using voice recognition software. Any grammatical or context distortions are unintentional and inherent to the the software.     Ema Quinones MD  Family Medicine [facility]    Subjective:     Estuardo Chavarria is a 30 y.o. female who  "presents to clinic for routine physical.    Additional concerns include:    1. Shingles: Patient wonders if the rash appreciated on her hip is herpes or shingles.  They occur most frequently under times of stress or infection.  They exclusively occur on the left side and the hip location.    PHQ-2 Score:  0    Health Care Directive: discussed    Patient Care Team:   PCP: Ema Quinones     Past Medical History, Family History, and Social History reviewed.     Review of systems:  Patient endorses: none  The remainder of the 10 system review is otherwise negative.    Objective:     /78   Temp 98  F (36.7  C)   Ht 5' 3\" (1.6 m)   Wt 148 lb (67.1 kg)   LMP 01/06/2020   SpO2 98%   BMI 26.22 kg/m    Gen: Alert, NAD, appears stated age, normal hygiene   Eyes: conjunctivae without injection, sclera clear, EOMI  ENT/mouth: nares clear, septum midline, absent rhinorrhea,absent pharyngeal injection, neck is supple, no thyroid enlargement  CV: RRR, no murmur appreciated, pedal edema absent bilaterally  Resp: CTAB, no wheezes, rales or ronchi  ABD: normoactive, non-tender to palpation, nondistended  MSK: grossly full range of motion in all joints, no obvious deformity  Neuro: CN II-XII grossly intact, no deficits in coordination  Psych: no apparent hallucinations or delusions, no pressured speech; alert, oriented x3  SKIN: dry and without lesions however evidence of residual cluster of lesions on the left hip  Heme/lymph: no pallor, no active bleeding/bruising, no adenopathy appreciated  :  - external genitalia: normal appearance, no lesions, no flattening of the anatomic structures  - urethral meatus: without prolapse, no obvious irritation  - vagina: expected resilience given estrogen status, no cystocele or rectocele, more discharge than expected  - cervix: normal, no lesions, no discharge; slightly friable  - uterus: anteverted  - anus and perineum: normal and without lesions  Breast:  - absent nipple " discharge, no masses appreciated, nontender to palpation      Medications:  Current Outpatient Medications   Medication Sig     desogestreL-ethinyl estradioL (APRI) 0.15-0.03 mg per tablet Take 1 tablet by mouth daily.     valACYclovir (VALTREX) 1000 MG tablet Take 1 tablet (1,000 mg total) by mouth 3 (three) times a day for 7 days.       Allergies:  No Known Allergies    PMH:  History reviewed. No pertinent past medical history.    PSH:  History reviewed. No pertinent surgical history.    Family Hx:  Family History   Problem Relation Age of Onset     Diabetes Paternal Grandfather        Social History:  Social History     Socioeconomic History     Marital status: Single     Spouse name: Not on file     Number of children: Not on file     Years of education: Not on file     Highest education level: Not on file   Occupational History     Not on file   Social Needs     Financial resource strain: Not on file     Food insecurity     Worry: Not on file     Inability: Not on file     Transportation needs     Medical: Not on file     Non-medical: Not on file   Tobacco Use     Smoking status: Never Smoker     Smokeless tobacco: Never Used   Substance and Sexual Activity     Alcohol use: Yes     Alcohol/week: 1.0 standard drinks     Types: 1 Glasses of wine per week     Drug use: No     Sexual activity: Yes     Partners: Male   Lifestyle     Physical activity     Days per week: Not on file     Minutes per session: Not on file     Stress: Not on file   Relationships     Social connections     Talks on phone: Not on file     Gets together: Not on file     Attends [employer] service: Not on file     Active member of club or organization: Not on file     Attends meetings of clubs or organizations: Not on file     Relationship status: Not on file     Intimate partner violence     Fear of current or ex partner: Not on file     Emotionally abused: Not on file     Physically abused: Not on file     Forced sexual activity: Not on file    Other Topics Concern     Not on file   Social History Narrative     Not on file       LDL Calculated (mg/dL)   Date Value   12/10/2019 140 (H)   09/26/2018 110        Immunization History   Administered Date(s) Administered     DTP 1991, 1991, 1991, 09/02/1992, 01/02/1996     HIB (HBOC) 1991, 1991, 1991, 09/02/1992     HPV Quadrivalent 09/04/2012, 11/05/2012, 03/11/2013     Hep B, Adult 10/02/2018, 11/08/2018     Hep B, Peds or Adolescent 1991, 1991, 08/12/1992     INFLUENZA,SEASONAL QUAD, PF, =/> 6months 11/15/2016     Influenza, inj, historic,unspecified 09/19/2018, 11/01/2019, 12/04/2020     MMR 08/12/1992, 06/13/2003     OPV, Unspecified 1991, 1991, 1991, 08/12/1992     OPV,Trivalent,Historic(3854-7740 only) 1991, 1991, 1991, 08/12/1992     Td, Adult, Absorbed 06/13/2003     Tdap 09/04/2012     There are no preventive care reminders to display for this patient.

## 2021-06-14 NOTE — TELEPHONE ENCOUNTER
RN cannot approve Refill Request    RN can NOT refill this medication Protocol failed and NO refill given. Last office visit: Visit date not found Last Physical: 12/10/2019 Last MTM visit: Visit date not found Last visit same specialty: 12/12/2018 Claude Sheffield MD.  Next visit within 3 mo: Visit date not found  Next physical within 3 mo: Visit date not found      Ivette Vallejo, [facility] Connection Triage/Med Refill 12/24/2020    Requested Prescriptions   Pending Prescriptions Disp Refills     desogestreL-ethinyl estradioL (APRI) 0.15-0.03 mg per tablet 84 tablet 0     Sig: Take 1 tablet by mouth daily.       Oral Contraceptives Protocol Failed - 12/22/2020  3:52 AM        Failed - Visit with PCP or prescribing provider visit in last 12 months      Last office visit with prescriber/PCP: Visit date not found OR same dept: Visit date not found OR same specialty: 12/12/2018 Claude Sheffield MD  Last physical: 12/10/2019 Last MTM visit: Visit date not found   Next visit within 3 mo: Visit date not found  Next physical within 3 mo: Visit date not found  Prescriber OR PCP: Ema Quinones MD  Last diagnosis associated with med order: 1. Encounter for initial prescription of contraceptive pills  - desogestreL-ethinyl estradioL (APRI) 0.15-0.03 mg per tablet; Take 1 tablet by mouth daily.  Dispense: 84 tablet; Refill: 0    If protocol passes may refill for 12 months if within 3 months of last provider visit (or a total of 15 months).

## 2021-06-14 NOTE — TELEPHONE ENCOUNTER
Per Dr Quinones 1/11/21:  Patient has a history of recurrent lesions occurring in a dermatomal pattern clustering in the area of the left hip consistent with shingles.  Given patient's young age, and the frequency of these outburst, I feel strongly that patient does need the zoster vaccination.

## 2021-06-14 NOTE — TELEPHONE ENCOUNTER
Left message to call back for: clarification  Information to relay to patient:  LM for pt to call back to clarify request for immuniztions and titer for school. Pt sent an appt request and asked for shingles vaccine.    Comments:   Get the shingles vaccine and I need to check for immunity for measles or get the vaccine for measles if not immune for school.      Shingrix is not recommended under age 49yo. We can place order for measles titer. Does she need mumps or rubella titer as well? Or just measles?

## 2021-06-16 PROBLEM — B02.9 HERPES ZOSTER WITHOUT COMPLICATION: Status: ACTIVE | Noted: 2021-01-11

## 2021-06-16 PROBLEM — G44.209 TENSION HEADACHE: Status: ACTIVE | Noted: 2019-12-10

## 2021-06-20 NOTE — PROGRESS NOTES
Assessment/Plan:     Patient presents today for routine physical examination.    Healthcare Maintenance: USPSTF recommendations for age 28;  - patient has been screened for cervical cancer per protocol in 2017, results were negative, next screening due in 2 years  - discussed folic acid supplementation if the patient has a possibility of becoming pregnant  - discussed intimate partner violence and there are no concerns at this time  - of the recommended screening for chlamydia, HIV, syphilis, gonorrhea, and hepatitis, patient chose no screening  - discussed healthful diet and physical activity for CVD disease prevention; based on BMI lipid and DM II screening was performed.   - immunizations are up to date      Additional concerns are as detailed below:    1. Anxiety  Patient resents to clinic for evaluation of anxiety.  Her PHQ 9 was 0 but her JASBIR 7 was 13.  She states it only minimally impacts her life.  We discussed cognitive behavioral therapy and a handout was provided.  Patient is more interested in medication.  We discussed that she could take anywhere from 12.5 mg tablets 3 times daily as needed up to 50 mg 3 times daily as needed.  Right now patient will return for her routine physical in 1 year, but if the anxiety worsens she will have a low threshold to return.    2. Health care maintenance  - Lipid [place] FASTING  - Comprehensive Metabolic Panel    3. Immunity status testing  - Hepatitis B Surface Antibody (Anti-HBs) Vaccine Check  - Varicella Zoster Antibody, IgG  - Rubeola Antibody, IgG  - Mumps Antibody, IgG  - Rubella Antibody, IgG  - QTF-Mycobacterium tuberculosis by QuantiFERON-TB Gold Plus    AVS printed and given to patient.  Return to clinic PRN.    Total time spent with patient was 35 minutes with greater than 50% spent in face-to-face counseling regarding the above plan.    This note has been dictated using voice recognition software. Any grammatical or context distortions are unintentional  "and inherent to the the software.     Ema Quinones MD  Family Medicine [facility]    Subjective:     Estuardo Chavarria is a 28 y.o. female who presents to clinic for routine physical.    Additional concerns include:    1. Anxiety: patient's JASBIR-7 scored a 13. Patient has not been treated for anxiety before.  Patient feels as though she has a good handle on April occasionally perseverate on thoughts or have a downward thought spiral.  Does not yet appear to be affecting her sleep and it only minimally impact her day.  She is interested in medication versus counseling.    Diet: well balanced diet  Physical Activity: The patient maintains a regular, healthy exercise program.    PHQ-2 Score:  0    Health Care Directive: not on file but handout provided    Patient Care Team:   PCP: Ema Quinones     Past Medical History, Family History, and Social History reviewed.     Review of systems is as stated in HPI.  Patient endorses: none  The remainder of the 10 system review is otherwise negative.    Objective:     /78  Pulse 76  Ht 5' 3\" (1.6 m)  Wt 149 lb (67.6 kg)  LMP 08/29/2018  SpO2 99%  BMI 26.39 kg/m2  Gen: Alert, NAD, appears stated age, normal hygiene   Eyes: conjunctivae without injection, sclera clear, EOMI  ENT/mouth: nares clear, septum midline, absent rhinorrhea,absent pharyngeal injection, neck is supple, no thyroid enlargement  CV: RRR, no murmur appreciated, pedal edema absent bilaterally  Resp: CTAB, no wheezes, rales or ronchi  ABD: normoactive, non-tender to palpation, nondistended  MSK: grossly full range of motion in all joints, no obvious deformity  Neuro: CN II-XII grossly intact, no deficits in coordination  Psych: no apparent hallucinations or delusions, no pressured speech; alert, oriented x3  SKIN: dry and without lesions  Heme/lymph: no pallor, no active bleeding/bruising, no adenopathy appreciated    Medications:  Current Outpatient Prescriptions   Medication Sig Note     " hydrOXYzine HCl (ATARAX) 25 MG tablet Take 1 tablet (25 mg total) by mouth 3 (three) times a day as needed for anxiety.      ORTHO TRI-CYCLEN, 28, 0.18/0.215/0.25 mg-35 mcg (28) Tab tablet  9/26/2018: Received from: External Pharmacy       Allergies:  No Known Allergies    PMH:  History reviewed. No pertinent past medical history.    PSH:  History reviewed. No pertinent surgical history.    Family Hx:  Family History   Problem Relation Age of Onset     Diabetes Paternal Grandfather        Social History:  Social History     Social History     Marital status: Single     Spouse name: N/A     Number of children: N/A     Years of education: N/A     Occupational History     Not on file.     Social History Main Topics     Smoking status: Never Smoker     Smokeless tobacco: Never Used     Alcohol use 0.6 oz/week     1 Glasses of wine per week     Drug use: No     Sexual activity: Yes     Partners: Male     Birth control/ protection: OCP     Other Topics Concern     Not on file     Social History Narrative     No narrative on file       No results found for: LDLCALC     Immunization History   Administered Date(s) Administered     DTP 1991, 1991, 1991, 09/02/1992, 01/02/1996     HIB (HBOC) 1991, 1991, 1991, 09/02/1992     HPV Quadrivalent 09/04/2012, 11/05/2012, 03/11/2013     Hep B, Peds or Adolescent 1991, 1991, 08/12/1992     Influenza,seasonal quad, PF, 36+MOS 11/15/2016     MMR 08/12/1992, 06/13/2003     OPV,Trivalent,Historic(3358-0583 only) 1991, 1991, 1991, 08/12/1992     Td, Adult, Absorbed 06/13/2003     Tdap 09/04/2012     There are no preventive care reminders to display for this patient.

## 2021-06-22 NOTE — PROGRESS NOTES
"Assessment:     1. Abdominal pain, epigastric  Hepatic Profile    Lipase    HM2(CBC w/o Differential)       Plan:     1. Abdominal pain, epigastric  Patient will take some OTC Zantac and I will do the following tests as a precaution  - Hepatic Profile  - Lipase  - HM2(CBC w/o Differential)      Subjective:   Patient was well in her usual state of health when she came home from work around midnight and 8 a prepared meal by her mother which was full of villarreal and pork.  Since that time she has had some abdominal bloating and increased stooling which is now improving since she made the appointment yesterday.  She denies any nausea or vomiting no blood in stool.  Rest of family did not get ill.  However she did tend to eat the meal towards the end of the preparation as it was cooler.  She feels it is related to food poisoning.  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  She has been pregnant in the past but he has not currently.  Exam was essentially unremarkable there was no guarding my plan is to check hemogram hepatic profile lipase and to encourage her to take some over-the-counter Zantac 75 mg I will alert her as to the results and if she fails to improve she will contact us she has no urinary tract symptoms urgency frequency dysuria.    Review of Systems: A complete 14 point review of systems was obtained and is negative or as stated in the history of present illness.    No past medical history on file.  Family History   Problem Relation Age of Onset     Diabetes Paternal Grandfather      No past surgical history on file.  Social History     Tobacco Use     Smoking status: Never Smoker     Smokeless tobacco: Never Used   Substance Use Topics     Alcohol use: Yes     Alcohol/week: 0.6 oz     Types: 1 Glasses of wine per week     Drug use: No         Objective:   /72   Pulse 64   Temp 97.8  F (36.6  C) (Oral)   Ht 5' 4\" (1.626 m)   Wt 149 lb 3.2 oz (67.7 kg)   BMI 25.61 kg/m      General Appearance:  " Alert, cooperative, no distress  Head:  Normocephalic, no obvious abnormality  Ears: TM anatomy normal  Eyes:  PERRL, EOM's intact, conjunctiva and corneas clear  Nose:  Nares symmetrical, septum midline, mucosa pink, no sinus tenderness  Throat:  Lips, tongue, and mucosa are moist, pink, and intact  Neck:  Supple, symmetrical, trachea midline, no adenopathy; thyroid: no enlargement, symmetric,no tenderness/mass/nodules; no carotid bruit, no JVD  Back:  Symmetrical, no curvature, ROM normal, no CVA tenderness  Chest/Breast:  No mass or tenderness  Lungs:  Clear to auscultation bilaterally, respirations unlabored   Heart:  Normal PMI, regular rate & rhythm, S1 and S2 normal, no murmurs, rubs, or gallops  Abdomen:  Soft, non-tender, bowel sounds active all four quadrants, no mass, or organomegaly patient does not guard there is no CVA tenderness  Musculoskeletal:  Tone and strength strong and symmetrical, all extremities  Lymphatic:  No adenopathy  Skin/Hair/Nails:  Skin warm, dry, and intact, no rashes  Neurologic:  Alert and oriented x3, no cranial nerve deficits, normal strength and tone, gait steady  Extremities:  No edema.  Farnaz's sign negative.    Genitourinary: deferred  Pulses:  Equal bilaterally           This note has been dictated using voice recognition software. Any grammatical or context distortions are unintentional and inherent to the the software.

## 2021-07-03 NOTE — ADDENDUM NOTE
Addendum Note by Inna Nance CMA at 9/28/2018  4:20 PM     Author: Inna Nance CMA Service: -- Author Type: Certified Medical Assistant    Filed: 9/28/2018  4:20 PM Encounter Date: 9/27/2018 Status: Signed    : Inna Nance CMA (Certified Medical Assistant)    Addended by: INNA NANCE on: 9/28/2018 04:20 PM        Modules accepted: Orders

## 2021-07-03 NOTE — ADDENDUM NOTE
Addendum Note by Ema Reinoso MD at 10/1/2018  7:43 AM     Author: Ema Reinoso MD Service: -- Author Type: Physician    Filed: 10/1/2018  7:43 AM Encounter Date: 9/27/2018 Status: Signed    : Ema Reinoso MD (Physician)    Addended by: EMA REINOSO on: 10/1/2018 07:43 AM        Modules accepted: Orders